# Patient Record
Sex: FEMALE | Race: OTHER | HISPANIC OR LATINO | Employment: FULL TIME | URBAN - METROPOLITAN AREA
[De-identification: names, ages, dates, MRNs, and addresses within clinical notes are randomized per-mention and may not be internally consistent; named-entity substitution may affect disease eponyms.]

---

## 2023-07-19 ENCOUNTER — TELEPHONE (OUTPATIENT)
Dept: GASTROENTEROLOGY | Facility: CLINIC | Age: 28
End: 2023-07-19

## 2023-07-19 ENCOUNTER — OFFICE VISIT (OUTPATIENT)
Dept: GASTROENTEROLOGY | Facility: CLINIC | Age: 28
End: 2023-07-19
Payer: COMMERCIAL

## 2023-07-19 VITALS
BODY MASS INDEX: 22.67 KG/M2 | WEIGHT: 132.8 LBS | HEIGHT: 64 IN | SYSTOLIC BLOOD PRESSURE: 106 MMHG | DIASTOLIC BLOOD PRESSURE: 80 MMHG

## 2023-07-19 DIAGNOSIS — R10.84 GENERALIZED ABDOMINAL PAIN: ICD-10-CM

## 2023-07-19 DIAGNOSIS — R11.0 NAUSEA: Primary | ICD-10-CM

## 2023-07-19 DIAGNOSIS — R11.2 NAUSEA AND VOMITING, UNSPECIFIED VOMITING TYPE: ICD-10-CM

## 2023-07-19 PROCEDURE — 99204 OFFICE O/P NEW MOD 45 MIN: CPT | Performed by: INTERNAL MEDICINE

## 2023-07-19 RX ORDER — ONDANSETRON 4 MG/1
4 TABLET, FILM COATED ORAL EVERY 8 HOURS PRN
Qty: 20 TABLET | Refills: 1 | Status: SHIPPED | OUTPATIENT
Start: 2023-07-19

## 2023-07-19 NOTE — TELEPHONE ENCOUNTER
Scheduled date of EGD(as of today):8/1/23  Physician performing EGD:RACHELLE  Location of EGD:[facility]  Instructions reviewed with patient by:HENRIETTA  Clearances: N

## 2023-07-19 NOTE — H&P (VIEW-ONLY)
[facility] - Outpatient Consultation  Mike Madsen 24 y.o. female MRN: [id number]  Encounter: [id number]    ASSESSMENT AND PLAN:      1. Nausea  2. Vomiting with nausea, unspecified vomiting type  3. Generalized abdominal pain    Patient with chronic symptoms of epigastric/right upper quadrant/right lower quadrant abdominal pain that comes in waves. Also with nausea and vomiting symptoms. Mild dysphagia. Chronic NSAID use    Differential remains broad at this time. Could be ulcerative disease, erosive disease, inflammatory bowel disease,    Work-up as below. Plan for EGD to evaluate. Right upper quadrant ultrasound to rule out biliary causes of abdominal pain    If positive fecal calprotectin will need to add on colonoscopy. If work-up unrevealing consider gastric emptying scan versus functional disease.    - CBC and differential; Future  - Calprotectin,Fecal; Future  - Giardia antigen; Future  - Comprehensive metabolic panel; Future  - Lipase; Future  - ondansetron (ZOFRAN) 4 mg tablet; Take 1 tablet (4 mg total) by mouth every 8 (eight) hours as needed for nausea or vomiting  Dispense: 20 tablet; Refill: 1  -  right upper quadrant; Future  - EGD; Future      Follow up Appointment: For EGD    Chief Complaint   Patient presents with   • Vomiting     Pt states that she has upper abdominal pain and when she eats she feels nauseous and vomits. Pt has an appetite but is reluctant to eat. Sour drinks she will immediately vomit. HPI:   Patient is a 19-year-old female primarily Georgian-speaking, no significant past medical history is presenting for symptoms of nausea and abdominal pain. Was recently seen in the emergency room in April 2023 with shortness of breath and chest pain. Found to have possible musculoskeletal pain. CBC, T4 was unremarkable.     Patient has had chronic symptoms of abdominal pain in the epigastric right upper quadrant and right lower quadrant region since childhood. Primarily in the epigastric region. Can differ in quality. Comes and goes. No triggers. Can last days. Tried omeprazole without much help. Also states occasional dysphagia symptoms. Does have chronic symptoms of nausea as well including episodes of vomiting. Also states reflux symptoms. Occasionally gets constipated. No GI bleeding. No unintentional weight loss. GI History:  Blood thinners: Denies ASA, antiplatelet, or anticoagulation  NSAID use: daily use for headaches. Insulin use: None    Abdominal Surgical Hx: csection  Family Hx: Denies first degree relatives with GI malignancies. GI procedure Hx: No hx of EGD or colonoscopies    Historical Information   Past Medical History:   Diagnosis Date   • Gastritis      Past Surgical History:   Procedure Laterality Date   •  SECTION       Social History     Substance and Sexual Activity   Alcohol Use Yes    Comment: Socially     Social History     Substance and Sexual Activity   Drug Use Never     Social History     Tobacco Use   Smoking Status Never   Smokeless Tobacco Never     History reviewed. No pertinent family history. Meds/Allergies     Current Outpatient Medications:   •  ondansetron (ZOFRAN) 4 mg tablet    No Known Allergies    PHYSICAL EXAM:    Blood pressure 106/80, height 5' 4" (1.626 m), weight 60.2 kg (132 lb 12.8 oz). Body mass index is 22.8 kg/m². General Appearance: NAD, cooperative, alert  Eyes: Anicteric  GI:  Soft, mild tenderness to the epigastrium, right upper quadrant, right lower quadrant, non-distended; normal bowel sounds; no masses, no organomegaly   Rectal: Deferred  Musculoskeletal: No edema.   Skin:  No jaundice    Lab Results:   No results found for: "WBC", "HGB", "MCV", "PLT", "INR"  No results found for: "NA", "K", "CL", "CO2", "ANIONGAP", "BUN", "CREATININE", "GLUCOSE", "GLUF", "CALCIUM", "CORRECTEDCA", "AST", "ALT", "ALKPHOS", "PROT", "BILITOT", "EGFR"  No results found for: "IRON", "TIBC", "FERRITIN"  No results found for: "LIPASE"    Radiology Results:   No results found. I have spent 45 minutes which was spent on one or more of the following: obtaining and reviewing separately obtained history, performing a medically appropriate examination and evaluation, counseling and educating the patient and significant other, ordering medications, tests, and procedures, documenting clinical information in the electronic or other health record, and care coordination.

## 2023-07-19 NOTE — PROGRESS NOTES
[facility] - Outpatient Consultation  Kumar Buclkey 24 y.o. female MRN: [id number]  Encounter: [id number]    ASSESSMENT AND PLAN:      1. Nausea  2. Vomiting with nausea, unspecified vomiting type  3. Generalized abdominal pain    Patient with chronic symptoms of epigastric/right upper quadrant/right lower quadrant abdominal pain that comes in waves. Also with nausea and vomiting symptoms. Mild dysphagia. Chronic NSAID use    Differential remains broad at this time. Could be ulcerative disease, erosive disease, inflammatory bowel disease,    Work-up as below. Plan for EGD to evaluate. Right upper quadrant ultrasound to rule out biliary causes of abdominal pain    If positive fecal calprotectin will need to add on colonoscopy. If work-up unrevealing consider gastric emptying scan versus functional disease.    - CBC and differential; Future  - Calprotectin,Fecal; Future  - Giardia antigen; Future  - Comprehensive metabolic panel; Future  - Lipase; Future  - ondansetron (ZOFRAN) 4 mg tablet; Take 1 tablet (4 mg total) by mouth every 8 (eight) hours as needed for nausea or vomiting  Dispense: 20 tablet; Refill: 1  -  right upper quadrant; Future  - EGD; Future      Follow up Appointment: For EGD    Chief Complaint   Patient presents with   • Vomiting     Pt states that she has upper abdominal pain and when she eats she feels nauseous and vomits. Pt has an appetite but is reluctant to eat. Sour drinks she will immediately vomit. HPI:   Patient is a 74-year-old female primarily Turkish-speaking, no significant past medical history is presenting for symptoms of nausea and abdominal pain. Was recently seen in the emergency room in April 2023 with shortness of breath and chest pain. Found to have possible musculoskeletal pain. CBC, T4 was unremarkable.     Patient has had chronic symptoms of abdominal pain in the epigastric right upper quadrant and right lower quadrant region since childhood. Primarily in the epigastric region. Can differ in quality. Comes and goes. No triggers. Can last days. Tried omeprazole without much help. Also states occasional dysphagia symptoms. Does have chronic symptoms of nausea as well including episodes of vomiting. Also states reflux symptoms. Occasionally gets constipated. No GI bleeding. No unintentional weight loss. GI History:  Blood thinners: Denies ASA, antiplatelet, or anticoagulation  NSAID use: daily use for headaches. Insulin use: None    Abdominal Surgical Hx: csection  Family Hx: Denies first degree relatives with GI malignancies. GI procedure Hx: No hx of EGD or colonoscopies    Historical Information   Past Medical History:   Diagnosis Date   • Gastritis      Past Surgical History:   Procedure Laterality Date   •  SECTION       Social History     Substance and Sexual Activity   Alcohol Use Yes    Comment: Socially     Social History     Substance and Sexual Activity   Drug Use Never     Social History     Tobacco Use   Smoking Status Never   Smokeless Tobacco Never     History reviewed. No pertinent family history. Meds/Allergies     Current Outpatient Medications:   •  ondansetron (ZOFRAN) 4 mg tablet    No Known Allergies    PHYSICAL EXAM:    Blood pressure 106/80, height 5' 4" (1.626 m), weight 60.2 kg (132 lb 12.8 oz). Body mass index is 22.8 kg/m². General Appearance: NAD, cooperative, alert  Eyes: Anicteric  GI:  Soft, mild tenderness to the epigastrium, right upper quadrant, right lower quadrant, non-distended; normal bowel sounds; no masses, no organomegaly   Rectal: Deferred  Musculoskeletal: No edema.   Skin:  No jaundice    Lab Results:   No results found for: "WBC", "HGB", "MCV", "PLT", "INR"  No results found for: "NA", "K", "CL", "CO2", "ANIONGAP", "BUN", "CREATININE", "GLUCOSE", "GLUF", "CALCIUM", "CORRECTEDCA", "AST", "ALT", "ALKPHOS", "PROT", "BILITOT", "EGFR"  No results found for: "IRON", "TIBC", "FERRITIN"  No results found for: "LIPASE"    Radiology Results:   No results found. I have spent 45 minutes which was spent on one or more of the following: obtaining and reviewing separately obtained history, performing a medically appropriate examination and evaluation, counseling and educating the patient and significant other, ordering medications, tests, and procedures, documenting clinical information in the electronic or other health record, and care coordination.

## 2023-07-29 LAB
ALBUMIN SERPL-MCNC: 4.1 G/DL (ref 3.6–5.1)
ALBUMIN/GLOB SERPL: 1.4 (CALC) (ref 1–2.5)
ALP SERPL-CCNC: 87 U/L (ref 31–125)
ALT SERPL-CCNC: 14 U/L (ref 6–29)
AST SERPL-CCNC: 19 U/L (ref 10–30)
BASOPHILS # BLD AUTO: 18 CELLS/UL (ref 0–200)
BASOPHILS NFR BLD AUTO: 0.3 %
BILIRUB SERPL-MCNC: 0.3 MG/DL (ref 0.2–1.2)
BUN SERPL-MCNC: 13 MG/DL (ref 7–25)
BUN/CREAT SERPL: NORMAL (CALC) (ref 6–22)
CALCIUM SERPL-MCNC: 9.3 MG/DL (ref 8.6–10.2)
CHLORIDE SERPL-SCNC: 107 MMOL/L (ref 98–110)
CO2 SERPL-SCNC: 26 MMOL/L (ref 20–32)
CREAT SERPL-MCNC: 0.57 MG/DL (ref 0.5–0.96)
EOSINOPHIL # BLD AUTO: 78 CELLS/UL (ref 15–500)
EOSINOPHIL NFR BLD AUTO: 1.3 %
ERYTHROCYTE [DISTWIDTH] IN BLOOD BY AUTOMATED COUNT: 12.6 % (ref 11–15)
GFR/BSA.PRED SERPLBLD CYS-BASED-ARV: 129 ML/MIN/1.73M2
GLOBULIN SER CALC-MCNC: 2.9 G/DL (CALC) (ref 1.9–3.7)
GLUCOSE SERPL-MCNC: 66 MG/DL (ref 65–99)
HCT VFR BLD AUTO: 38.7 % (ref 35–45)
HGB BLD-MCNC: 12.8 G/DL (ref 11.7–15.5)
LIPASE SERPL-CCNC: 19 U/L (ref 7–60)
LYMPHOCYTES # BLD AUTO: 1968 CELLS/UL (ref 850–3900)
LYMPHOCYTES NFR BLD AUTO: 32.8 %
MCH RBC QN AUTO: 29.2 PG (ref 27–33)
MCHC RBC AUTO-ENTMCNC: 33.1 G/DL (ref 32–36)
MCV RBC AUTO: 88.2 FL (ref 80–100)
MONOCYTES # BLD AUTO: 492 CELLS/UL (ref 200–950)
MONOCYTES NFR BLD AUTO: 8.2 %
NEUTROPHILS # BLD AUTO: 3444 CELLS/UL (ref 1500–7800)
NEUTROPHILS NFR BLD AUTO: 57.4 %
PLATELET # BLD AUTO: 196 THOUSAND/UL (ref 140–400)
PMV BLD REES-ECKER: 11.5 FL (ref 7.5–12.5)
POTASSIUM SERPL-SCNC: 4.3 MMOL/L (ref 3.5–5.3)
PROT SERPL-MCNC: 7 G/DL (ref 6.1–8.1)
RBC # BLD AUTO: 4.39 MILLION/UL (ref 3.8–5.1)
SODIUM SERPL-SCNC: 141 MMOL/L (ref 135–146)
WBC # BLD AUTO: 6 THOUSAND/UL (ref 3.8–10.8)

## 2023-08-01 ENCOUNTER — HOSPITAL ENCOUNTER (OUTPATIENT)
Dept: GASTROENTEROLOGY | Facility: AMBULATORY SURGERY CENTER | Age: 28
Discharge: HOME | End: 2023-08-01
Attending: INTERNAL MEDICINE
Payer: COMMERCIAL

## 2023-08-01 ENCOUNTER — ANESTHESIA EVENT (OUTPATIENT)
Dept: GASTROENTEROLOGY | Facility: AMBULATORY SURGERY CENTER | Age: 28
End: 2023-08-01

## 2023-08-01 ENCOUNTER — ANESTHESIA (OUTPATIENT)
Dept: GASTROENTEROLOGY | Facility: AMBULATORY SURGERY CENTER | Age: 28
End: 2023-08-01

## 2023-08-01 VITALS
BODY MASS INDEX: 22.53 KG/M2 | DIASTOLIC BLOOD PRESSURE: 75 MMHG | RESPIRATION RATE: 22 BRPM | HEART RATE: 89 BPM | TEMPERATURE: 99.3 F | OXYGEN SATURATION: 100 % | WEIGHT: 132 LBS | SYSTOLIC BLOOD PRESSURE: 111 MMHG | HEIGHT: 64 IN

## 2023-08-01 DIAGNOSIS — R10.84 GENERALIZED ABDOMINAL PAIN: ICD-10-CM

## 2023-08-01 LAB
EXT PREGNANCY TEST URINE: NEGATIVE
EXT. CONTROL: NORMAL

## 2023-08-01 PROCEDURE — 88305 TISSUE EXAM BY PATHOLOGIST: CPT | Performed by: PATHOLOGY

## 2023-08-01 PROCEDURE — 43239 EGD BIOPSY SINGLE/MULTIPLE: CPT | Performed by: INTERNAL MEDICINE

## 2023-08-01 RX ORDER — PROPOFOL 10 MG/ML
INJECTION, EMULSION INTRAVENOUS AS NEEDED
Status: DISCONTINUED | OUTPATIENT
Start: 2023-08-01 | End: 2023-08-01

## 2023-08-01 RX ORDER — OMEPRAZOLE 40 MG/1
40 CAPSULE, DELAYED RELEASE ORAL DAILY
Qty: 30 CAPSULE | Refills: 1 | Status: SHIPPED | OUTPATIENT
Start: 2023-08-01 | End: 2023-09-30

## 2023-08-01 RX ORDER — SODIUM CHLORIDE, SODIUM LACTATE, POTASSIUM CHLORIDE, CALCIUM CHLORIDE 600; 310; 30; 20 MG/100ML; MG/100ML; MG/100ML; MG/100ML
50 INJECTION, SOLUTION INTRAVENOUS CONTINUOUS
Status: DISCONTINUED | OUTPATIENT
Start: 2023-08-01 | End: 2023-08-05 | Stop reason: HOSPADM

## 2023-08-01 RX ADMIN — PROPOFOL 100 MG: 10 INJECTION, EMULSION INTRAVENOUS at 10:12

## 2023-08-01 RX ADMIN — SODIUM CHLORIDE, SODIUM LACTATE, POTASSIUM CHLORIDE, CALCIUM CHLORIDE 50 ML/HR: 600; 310; 30; 20 INJECTION, SOLUTION INTRAVENOUS at 09:55

## 2023-08-01 RX ADMIN — SODIUM CHLORIDE, SODIUM LACTATE, POTASSIUM CHLORIDE, CALCIUM CHLORIDE: 600; 310; 30; 20 INJECTION, SOLUTION INTRAVENOUS at 10:14

## 2023-08-01 RX ADMIN — PROPOFOL 100 MG: 10 INJECTION, EMULSION INTRAVENOUS at 10:10

## 2023-08-01 NOTE — ANESTHESIA POSTPROCEDURE EVALUATION
Post-Op Assessment Note    CV Status:  Stable  Pain Score: 0    Pain management: adequate     Mental Status:  Alert and awake   Hydration Status:  Euvolemic   PONV Controlled:  Controlled   Airway Patency:  Patent      Post Op Vitals Reviewed: Yes      Staff: CRNA         No notable events documented.     BP   113/70   Temp   98   Pulse  87   Resp  16   SpO2   100

## 2023-08-01 NOTE — ANESTHESIA PREPROCEDURE EVALUATION
Procedure:  EGD    Relevant Problems   No relevant active problems        Physical Exam    Airway    Mallampati score: I  TM Distance: >3 FB  Neck ROM: full     Dental       Cardiovascular  Cardiovascular exam normal    Pulmonary  Pulmonary exam normal     Other Findings        Anesthesia Plan  ASA Score- 1     Anesthesia Type- IV sedation with anesthesia with ASA Monitors. Additional Monitors:   Airway Plan:           Plan Factors-Exercise tolerance (METS): >4 METS. Chart reviewed. EKG reviewed. Imaging results reviewed. Existing labs reviewed. Patient summary reviewed. Induction- intravenous. Postoperative Plan- Plan for postoperative opioid use. Planned trial extubation    Informed Consent- Anesthetic plan and risks discussed with patient. I personally reviewed this patient with the CRNA. Discussed and agreed on the Anesthesia Plan with the CRNA. Donovan Whittington

## 2023-08-07 PROCEDURE — 88305 TISSUE EXAM BY PATHOLOGIST: CPT | Performed by: PATHOLOGY

## 2023-09-02 ENCOUNTER — OFFICE VISIT (OUTPATIENT)
Dept: URGENT CARE | Facility: CLINIC | Age: 28
End: 2023-09-02
Payer: COMMERCIAL

## 2023-09-02 VITALS
HEIGHT: 64 IN | WEIGHT: 132.4 LBS | SYSTOLIC BLOOD PRESSURE: 111 MMHG | OXYGEN SATURATION: 98 % | DIASTOLIC BLOOD PRESSURE: 75 MMHG | BODY MASS INDEX: 22.61 KG/M2 | TEMPERATURE: 98.3 F | RESPIRATION RATE: 18 BRPM | HEART RATE: 92 BPM

## 2023-09-02 DIAGNOSIS — Z02.4 DRIVER'S PERMIT PE (PHYSICAL EXAMINATION): Primary | ICD-10-CM

## 2023-09-02 RX ORDER — AMOXICILLIN 500 MG/1
CAPSULE ORAL
COMMUNITY
Start: 2023-08-23
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
